# Patient Record
Sex: FEMALE | Race: WHITE | NOT HISPANIC OR LATINO | URBAN - METROPOLITAN AREA
[De-identification: names, ages, dates, MRNs, and addresses within clinical notes are randomized per-mention and may not be internally consistent; named-entity substitution may affect disease eponyms.]

---

## 2017-11-02 ENCOUNTER — OFFICE VISIT (OUTPATIENT)
Dept: SURGERY | Facility: CLINIC | Age: 22
End: 2017-11-02
Payer: COMMERCIAL

## 2017-11-02 VITALS
BODY MASS INDEX: 25.69 KG/M2 | WEIGHT: 145 LBS | TEMPERATURE: 99 F | HEIGHT: 63 IN | SYSTOLIC BLOOD PRESSURE: 97 MMHG | DIASTOLIC BLOOD PRESSURE: 65 MMHG | HEART RATE: 64 BPM

## 2017-11-02 DIAGNOSIS — N64.89 NIPPLE CRUSTING: Primary | ICD-10-CM

## 2017-11-02 PROCEDURE — 99203 OFFICE O/P NEW LOW 30 MIN: CPT | Mod: 25,S$GLB,, | Performed by: SURGERY

## 2017-11-02 PROCEDURE — 88312 SPECIAL STAINS GROUP 1: CPT | Mod: 26,,, | Performed by: PATHOLOGY

## 2017-11-02 PROCEDURE — 88305 TISSUE EXAM BY PATHOLOGIST: CPT | Performed by: PATHOLOGY

## 2017-11-02 PROCEDURE — 11100 PR BIOPSY OF SKIN LESION: CPT | Mod: S$GLB,,, | Performed by: SURGERY

## 2017-11-02 PROCEDURE — 99999 PR PBB SHADOW E&M-NEW PATIENT-LVL III: CPT | Mod: PBBFAC,,, | Performed by: SURGERY

## 2017-11-02 NOTE — PROGRESS NOTES
CHIEF COMPLAINT:  Left nipple growth for one year.    HISTORY OF PRESENT ILLNESS:  The patient is a pleasant 22-year-old    female who presents alone for initial consultation.  She states that over the   past year she has had intermittent growth with pain, but mostly irritation,   pruritus with scaling, flaking and excoriated skin located over about a 5 mm   area at the left upper inner margin of the nipple itself.  The area does not   extend down to the areola.  Today, there is some slight crust on the 5 mm area,   which was debrided and there is underlying raw excoriated erythematous scaly   tissue.    PAST MEDICAL HISTORY:  Otherwise noncontributory.    PAST SURGICAL HISTORY:  Significant for spinal surgeries for scoliosis.  She has   also had left ear surgery.    MEDICATIONS:  She is currently on no medications.    ALLERGIES:  Ancef.    GYNECOLOGICAL HISTORY:  Reveals that she is a  1 with menarche at age 8.    She is currently premenopausal with normal menses.    FAMILY HISTORY:  Significant for breast cancer in a maternal grandmother as well   as a maternal great aunt who was the sister of the grandmother with breast   cancer.  Her mother has had benign breast issues.  She denies any personal   history of breast cancer.    PHYSICAL EXAMINATION:  BREASTS:  Physical exam is otherwise unremarkable and limited to the left breast   and right breast.  The breasts are symmetrical.  There are no suspicious   masses, nodules, densities, edema or erythema of the breast itself.  There is no   axillary, cervical or supraclavicular lymphadenopathy.  The right nipple is   within normal limits.  The left nipple has about a 5 mm area of scaly, crusting,   flaky, excoriated area along the upper inner 10 to 11 o'clock region of the   left nipple itself.  This does not extend down to the areola and only involves a   small portion of the actual nipple itself.    ASSESSMENT AND PLAN:  Likely benign etiology,  probably consistent with   dermatitis.  Nonetheless, given the presentation, we will perform a 4 mm punch   biopsy to rule out Paget disease of the nipple.  Under sterile technique and   local anesthesia, the left nipple-areolar complex was locally anesthetized.  We   performed a mm punch biopsy of the area along the left upper inner quadrant of   the nipple and submitted to Pathology.  She tolerated the punch biopsy well   without complication.  Estimated blood loss was minimal and the wound was   dressed with a dry sterile dressing.  We will phone review the results and she   will follow up with me p.r.n. at this point pending the pathology results on the   nipple punch biopsy.    Approximate time spent with the patient today was 30 minutes with greater than   50% of this time in counseling regarding potential etiology to the clinical   findings.      TAN/NIRMAL  dd: 11/02/2017 13:24:00 (CDT)  td: 11/03/2017 09:25:08 (CDT)  Doc ID   #[id number]  Job ID #[id number]    CC:     Job # [id number].

## 2017-11-13 DIAGNOSIS — L30.9 DERMATITIS OF NIPPLE: Primary | ICD-10-CM
